# Patient Record
Sex: FEMALE
[De-identification: names, ages, dates, MRNs, and addresses within clinical notes are randomized per-mention and may not be internally consistent; named-entity substitution may affect disease eponyms.]

---

## 2018-10-08 LAB
ANION GAP SERPL CALC-SCNC: 11 MMOL/L (ref 8–16)
BUN SERPL-MCNC: 18 MG/DL (ref 7–26)
BUN/CREAT SERPL: 24 (ref 6–25)
CALCIUM SERPL-MCNC: 9.9 MG/DL (ref 8.4–10.2)
CHLORIDE SERPL-SCNC: 103 MMOL/L (ref 98–107)
CO2 SERPL-SCNC: 28 MMOL/L (ref 22–29)
EGFRCR SERPLBLD CKD-EPI 2021: > 60 ML/MIN (ref 60–?)
GLUCOSE SERPLBLD-MCNC: 86 MG/DL (ref 74–118)
POTASSIUM SERPL-SCNC: 4 MMOL/L (ref 3.5–5.1)
SODIUM SERPL-SCNC: 139 MMOL/L (ref 136–145)

## 2018-10-15 ENCOUNTER — HOSPITAL ENCOUNTER (OUTPATIENT)
Dept: HOSPITAL 88 - OR | Age: 63
Discharge: HOME | End: 2018-10-15
Attending: SPECIALIST
Payer: COMMERCIAL

## 2018-10-15 VITALS — SYSTOLIC BLOOD PRESSURE: 128 MMHG | DIASTOLIC BLOOD PRESSURE: 79 MMHG

## 2018-10-15 DIAGNOSIS — I10: ICD-10-CM

## 2018-10-15 DIAGNOSIS — G56.02: Primary | ICD-10-CM

## 2018-10-15 DIAGNOSIS — E66.01: ICD-10-CM

## 2018-10-15 DIAGNOSIS — Z01.810: ICD-10-CM

## 2018-10-15 DIAGNOSIS — M65.312: ICD-10-CM

## 2018-10-15 DIAGNOSIS — Z79.82: ICD-10-CM

## 2018-10-15 DIAGNOSIS — M06.9: ICD-10-CM

## 2018-10-15 DIAGNOSIS — M19.90: ICD-10-CM

## 2018-10-15 DIAGNOSIS — E78.5: ICD-10-CM

## 2018-10-15 DIAGNOSIS — Z01.812: ICD-10-CM

## 2018-10-15 PROCEDURE — 80048 BASIC METABOLIC PNL TOTAL CA: CPT

## 2018-10-15 PROCEDURE — 36415 COLL VENOUS BLD VENIPUNCTURE: CPT

## 2018-10-15 PROCEDURE — 26055 INCISE FINGER TENDON SHEATH: CPT

## 2018-10-15 PROCEDURE — 29848 WRIST ENDOSCOPY/SURGERY: CPT

## 2018-10-15 PROCEDURE — 93005 ELECTROCARDIOGRAM TRACING: CPT

## 2018-10-15 NOTE — OPERATIVE REPORT
DATE OF PROCEDURE:  October 15, 2018 



ASSISTANT:   Adilson Camejo PA-C



The patient was brought to the operating room for induction of anesthesia.  

Throughout this case, my PA's assistance was necessary for retraction of 

soft tissue and positioning of the extremity.  This allows for efficient 

and technically successful execution of the operation and is considered 

medically necessary.



PREOPERATIVE DIAGNOSES

1. Left carpal tunnel syndrome.

2. Left trigger thumb.  



POSTOPERATIVE DIAGNOSES

1. Left carpal tunnel syndrome.

2. Left trigger thumb.  



PROCEDURES

1. Left endoscopic carpal tunnel release.  

2. Left trigger thumb release.



INDICATIONS:  The patient is a 63-year-old woman who has clinic signs and 

symptoms consistent with left carpal tunnel syndrome and left trigger 

thumb.  She has failed conservative management and would like to proceed 

with surgical intervention.  The risks and benefits of a left endoscopic 

versus open carpal tunnel release and left trigger thumb release have been 

explained.  She states she understands and wishes to proceed.



DESCRIPTION OF PROCEDURE:  The patient was brought to the operating room 

and placed under general anesthetic.  Her left upper extremity was prepped 

and draped in a sterile manner.  A preoperative time out was performed.  

The extremity was exsanguinated, and a proximal tourniquet was inflated to 

250 mmHg.  Initial attention was directed towards the carpal tunnel.  An 

incision was made over the flexion crease of the left wrist.  The palmaris 

longus was retracted to the radial side of the wound.  The flexor 

retinaculum was elevated and incised.  An elevator was used to tease the 

tenosynovium off of the undersurface of the transverse carpal ligament.  

Dilators were placed.  The hook of the hamate was palpated.  The MicroAire 

endoscope was then placed into the carpal tunnel.  The undersurface of the 

transverse carpal ligament could be cleanly visualized without evidence of 

soft-tissue interposition.  The knife was deployed, and the ligament was 

cut.  Full-thickness release was noted.  The proximal retinaculum was 

incised under direct visualization using a pair of blunt Metzenbaum 

scissors.  The incision was then closed with 2 interrupted nylon stitches.  

A sterile bandage was applied.  



Attention was then directed towards the thumb.  An incision was made at the 

base of the thumb.  The A1 pulley was carefully exposed.  This was 

completely released with a 15-blade surgical knife.  The tendon was 

retracted from the wound.  Some intratendinous adhesions were released.  

The thumb was noted to flex and extend without any further stenosing 

tenosynovitis.  The wound was irrigated and closed with 2 interrupted nylon 

stitches.  A sterile bandage was applied.  



The patient was then extubated and transported to the recovery room in 

stable condition.  There was no blood loss, and all needle and sponge 

counts were correct.







DD:  10/15/2018 09:35

DT:  10/15/2018 11:07

Job#:  [id number]

## 2018-10-16 NOTE — XMS REPORT
[facility]

                             Created on: 2016



Yarely Hayes

External Reference #: [id number]

: 1955

Sex: Female



Demographics







                          Address                   [address] [place]  [address]

 

                          Home Phone                [phone]

 

                          Preferred Language        Unknown

 

                          Marital Status            Unknown

 

                          [place] Affiliation     Unknown

 

                          Race                      Unknown

 

                          Ethnic Group              /Latin





Author







                          Author                    Mayra Daley

 

                          Organization              [facility]

 

                          Address                   Unknown

 

                          Phone                     Unavailable







Care Team Providers







                    Care Team Member Name    Role                Phone

 

                    Kristycorona  Mayra      CP                  Unavailable



                                                                



Allergies, Adverse Reactions, Alerts

          





                    Substance           Reaction            Event Type

 

                    N.K.D.A.            Info Not Available    Non Drug Allergy



                                                                    



Encounters

          





                    Encounter           Location            Date

 

                    Osteoarthritis      [facility]    2016



                                                                                
       



Problems

          





             Problem Type    Condition    ICD-9 Code    Onset Dates    Condition Status

 

             Assessment    Pain in right knee    M25.561                   Active

 

             Assessment    Asymptomatic menopausal state    V49.81                    Active

 

             Problem      Osteoarthritis of knees, bilateral    M17.0                     Active

 

             Assessment    Counseling NOS    Z71.9                     Active

 

             Assessment    Pain in left knee    M25.562                   Active

 

             Assessment    Osteoarthritis of knees, bilateral    M17.0                     Active



                                                                                
                                                         



Medications

          





        Medication    Code System    Code    Instructions    Start Date    End Date    Status    Dosage



 

        Diclofenac Sodium    [facility]    [address]-36    1 % Transdermal                     Active    Unknown



 

        Simvastatin    [facility]    [address]-54    40 MG Orally Once a day                    Active    1 tablet

 in the evening

 

        Aspirin    [facility]    [address]-96    81 MG Orally Once a day                    Active    1 tablet



 

        Lidocaine    [facility]    [address]-93    4 % Externally                     Active    Unknown

 

        Multivitamin/Minerals    Unknown    0         QD                    Active    1 Tab

 

        RelyyT    [facility]    [address]-37    0.025-5 % Externally                     Active    Unknown

 

                Diclofenac-Misoprostol    [facility]        [address]-82    75-0.2 MG Orally Twice a day prn

 with food      2016                    Active          1 tablet

 

                Lisinopril-Hydrochlorothiazide    [facility]        [address]-36    20-25 MG Orally Once a 

day                                             Active          1 tablet

 

          Prevacid 24HR    [facility]    [address]-10    15 MG Orally Once a day PRN                        Active

                                        1 capsule



                                                                                
                                                                                
      



Social History

          





                    Social History Element    Qualifiers          Date Reported

 

                    Smoking status:     .  Are you a: Never Smoker    2016



                                                                                
                 



Vital Signs

          





                          Date/Time:                2016

 

                          Height                    61 in

 

                          Blood Pressure Diastolic    81 mm Hg

 

                          Blood Pressure Systolic    131 mm Hg

 

                          Weight                    243.0 lbs



                                                                    



Summary Purpose

          [employer] Submission

## 2018-10-16 NOTE — XMS REPORT
[facility]ology Clinic

                             Created on: 05/15/2018



Yarely Hayes

External Reference #: [id number]

: 1955

Sex: Female



Demographics







                          Address                   [address] [place]  [address]

 

                          Home Phone                [phone]

 

                          Preferred Language        Unknown

 

                          Marital Status            Unknown

 

                          Hoahaoism Affiliation     Unknown

 

                          Race                      Unknown

 

                          Ethnic Group              /Latin





Author







                          Author                    Mayra Daley

 

                          Organization              [facility]

 

                          Address                   Unknown

 

                          Phone                     Unavailable







Care Team Providers







                    Care Team Member Name    Role                Phone

 

                    Mayra Daley      CP                  Unavailable



                                                                



Allergies, Adverse Reactions, Alerts

          





                    Substance           Reaction            Event Type

 

                    N.K.D.A.            Info Not Available    Non Drug Allergy



                                                                                
       



Problems

          





             Problem Type    Condition    Code         Onset Dates    Condition Status

 

             Assessment    ESR raised    R70.0                     Active

 

             Assessment    Vitamin D deficiency    E55.9                     Active

 

             Problem      Osteoarthritis of knees, bilateral    M17.0                     Active

 

             Assessment    Pain in right knee    M25.561                   Active

 

             Problem      Vitamin D deficiency    E55.9                     Active

 

             Assessment    Counseling NOS    Z71.9                     Active

 

             Assessment    Osteopenia    M85.80                    Active

 

             Assessment    Pain in left knee    M25.562                   Active

 

             Assessment    Osteoarthritis of knees, bilateral    M17.0                     Active



                                                                                
                                                                                
      



Medications

          





        Medication    Code System    Code    Instructions    Start Date    End Date    Status    Dosage



 

        Aspirin    [facility]     12062837909    81 MG Orally Once a day                    Active    1 tablet

 

        Calcium    [facility]     0        Oral                     Active    1 tab

 

        Bioflex    [facility]     [address]-40     Orally Once a day                    Active    1 tab(s)

 

                Diclofenac-Misoprostol    [facility]             68992876749     75-0.2 MG Orally Twice a day prn with 

food                                            Active          1 tablet

 

             Lisinopril-Hydrochlorothiazide    [facility]          86103566471    20-25 MG Orally Once a day      

                                        Active              1 tablet

 

        Prevacid 24HR    [facility]     36510898160    15 MG Orally Once a day PRN                    Active    1 capsule



 

        Multivitamin/Minerals    [facility]     0         QD                    Active    1 Tab

 

           Vitamin D (Ergocalciferol)    [facility]        90384693285    55129 UNIT Orally Once weekly               

                          Active                    1 capsule

 

        Simvastatin    [facility]     63642182016    40 MG Orally Once a day                    Active    1 tablet in

 the evening



                                                                                
                                                                                
                



Vital Signs

          





                          Date/Time:                May 14, 2018

 

                          Height                    61 in

 

                          Blood Pressure Diastolic    58 mm Hg

 

                          Blood Pressure Systolic    94 mm Hg

 

                          Weight                    243.6 lbs



                                                                              



Results

          No Known Results                                                      
             



Summary Purpose

          [employer] Submission

## 2018-10-16 NOTE — XMS REPORT
Continuity of Care Document

                             Created on: 2018



LES HAYES

External Reference #: [id number]

: 1955

Sex: Female



Demographics







                          [place]                   [address] [place]  [address]

 

                          Home Phone                [phone]

 

                          Preferred Language        Unknown

 

                          Marital Status            Unknown

 

                          Caodaism Affiliation     Unknown

 

                          Race                      Unknown

 

                          Ethnic Group              Unknown





Author







                          Author                    [facility]              Interface

 

                          Address                   Unknown

 

                          Phone                     Unavailable



                                                    



Problems

                    





                    Problem                            Status                            Onset Date     

                          Classification                            Date Reported       

                          Comments                            Source                    

 

                          Z12.31 - ENCNTR SCREEN MAMMOGRAM FOR MA                            Active         

                    2016                                                          

                                                       [facility] Seymour               

     

 

                          V76.12 - SCREEN MAMMOGRA                            Active                        

                    2014                                                                         

                                                       [facility] Seymour                    

 

                    ESR raised                            Active                                        

                          Diagnosis                            2018                      

                                                      Mayralaurel Daley                    

 

                    Vitamin D deficiency                            Active                              

                          Diagnosis                            2018            

                                                      Mayralaurel Daley                    

 

                          Osteoarthritis of knees, bilateral                            Active              

                                                Problem                            2018

                                                        Mayra Nacorona                 

   

 

                    Pain in right knee                            Active                                

                          Diagnosis                            2018              

                                                      Mayra Daley                    

 

                    Counseling NOS                            Active                                    

                          Diagnosis                            2018                  

                                                      Mayralaurel Daley                    

 

                    Osteopenia                            Active                                        

                          Diagnosis                            2018                      

                                                      Mayralaurel Daley                    

 

                    Pain in left knee                            Active                                 

                          Diagnosis                            2018               

                                                      Mayralaurel Daley                    

 

                          Asymptomatic menopausal state                            Active                   

                                                Diagnosis                            2016   

                                                      Mayra Ramilalorie                    



 

                                        Age-related osteoporosis without current pathological fracture                  

                    Active                                                        Diagnosis      

                          2016                                                     

                                        Mayra Daley                    



                                                                                
                                                                                
                                                                                
     



Medications

                    





                    Medication                            Details                            Route      

                          Status                            Patient Instructions         

                          Ordering Provider                            Order Date           

                                        Source                    

 

                          Diclofenac-Misoprostol                            1 tablet                        

                    Orally                            Active                            75-0.2 MG Orally

 Twice a day prn with food                            [facility]                        

                          2017                            Mayra Ramila                    

 

                          Diclofenac-Misoprostol                            1 tablet                        

                    Orally                            Active                            75-0.2 MG Orally

 Twice a day prn with food                            [facility]                        

                          2017                            Mayra Edi                    

 

                          Pennsaid                            2 applications to affected area               

                    Transdermal                            Active                            2

 % Transdermal Twice a day                            [facility]                        

                          2016                            Mayra Daley                    

 

                          Vitamin D (Ergocalciferol)                            1 capsule                   

                    Orally                            Active                            89027 UNIT

 Orally Once weekly                            [facility]                            2016

                                        Mayra Mcgrath                    

 

                          Diclofenac-Misoprostol                            1 tablet                        

                    Orally                            Active                            75-0.2 MG Orally

 Twice a day prn with food                            [facility]                        

                          2016                            Mayra Daley                    

 

                          Simvastatin                            1 tablet in the evening                    

                    Orally                            Active                            40 MG Orally

 Once a day                            [facility] Ramila                    

 

                          Vitamin D (Ergocalciferol)                            1 capsule                   

                    Orally                            Active                            81270 UNIT

 Orally Once weekly                            [facility]                                 

                                        Mayra Mcgrathm                    

 

                    Multivitamin/Minerals                            1 Tab                            NA

                            Active                            QD                     

                    Nacorona                                                        Mayralaurel Daley       

             

 

                    Aspirin                            1 tablet                            Orally       

                          Active                            81 MG Orally Once a day       

                     Najalorie Daley                    

 

                          Diclofenac-Misoprostol                            1 tablet                        

                    Orally                            Active                            75-0.2 MG Orally

 Twice a day prn with food                            Edi Daley                    

 

                    Bioflex                            1 tab(s)                            Orally       

                          Active                            Orally Once a day             

                    Najam                                                        Mayra Najalorie

                    

 

                    Calcium                            1 tab                            Oral            

                    Active                            Oral                            Najam

                                                        Mayra Najam                 

   

 

                    Prevacid 24HR                            1 capsule                            Orally

                            Active                            15 MG Orally Once a day

 PRN                            Najam                                                

                                        Mayra Nacorona                    

 

                          Lisinopril-Hydrochlorothiazide                            1 tablet                

                    Orally                            Active                            20-25 MG

 Orally Once a day                            Najalorie                                  

                                        Mayra Nacorona                    

 

                          Simvastatin                            1 tablet in the evening                    

                    Orally                            Active                            40 MG Orally

 Once a day                            Najalorie                                         

                                        Mayra Nacorona                    

 

                    Prevacid 24HR                            1 capsule                            Orally

                            Active                            15 MG Orally Once a day

 PRN                            Najam                                                

                                        Mayra Nacorona                    

 

                          Lisinopril-Hydrochlorothiazide                            1 tablet                

                    Orally                            Active                            20-25 MG

 Orally Once a day                            Nacorona Daley                    

 

                    Aspirin                            1 tablet                            Orally       

                          Active                            81 MG Orally Once a day       

                     Nacorona Daley                    

 

                    Diclofenac Sodium                            not defined                            

Transdermal                            Active                            1 % Transdermal

                            Najam                                                    

                                        Mayra Nacorona                    

 

                    Lidocaine                            Unknown                            Externally  

                          Active                            4 % Externally           

                    Najam                                                        Mayra Nacorona

                    

 

                    Multivitamin/Minerals                            1 Tab                            NA

                            Active                            QD                     

                    Nagingerm                                                        Mayra Nacorona       

             

 

                    RelyyT                            Unknown                            Externally     

                          Active                            0.025-5 % Externally        

                    Najam                                                        Mayra

 Najalorie                    

 

                    Calcium                            1 tab                            Oral            

                    Active                            Oral                            Nagingerm

                                                        Mayralaurel Daley                 

   

 

                          Diclofenac-Misoprostol                            1 tablet                        

                    Orally                            Active                            75-0.2 MG Orally

 Twice a day prn with food                            Edi Daley                    



                                                                                
                                                                                
                                                                                
                                                                                
                                                                                
                                                   



Allergies, Adverse Reactions, Alerts

                    





                    Substance                            Category                            Reaction   

                          Severity                            Reaction type           

                          Status                            Date Reported                     

                          Comments                            Source                    

 

                    N.K.D.A.                            Adverse Reaction                            Info

 Not Available                                                        Adverse Reaction

                            Active                            2018             

                                                      Mayra Nacorona                    



                                                                        



Immunizations

                    





                    Immunization                            Date Given                            Site  

                          Status                            Last Updated             

                          Comments                            Source                    



                                                                        



Results

                    





                    Order Name                            Results                            Value      

                          Reference Range                            Date                

                          Interpretation                            Comments                       

                                        Source                    

 

                          Breast Mammo Scrn MATT incl CAD MA                            Breast Mammo Scrn MATT

 incl CAD MA                         





BILATERAL DIGITAL SCREENING MAMMOGRAM WITH CAD: 10/7/2017





CLINICAL: /Z12.31.  





Current study was evaluated with a Computer Aided Detection (CAD) system.  



COMPARISON:Comparison is made to exams dated:  2016 mammogram, 2015 
mammogram, 2014 mammogram, 2013 mammogram - [facility],
2012 mammogram, and 2011 mammogram - [facility].   



TECHNIQUE: Mammographic views were obtained using digital acquisition. Current 
study was also evaluated with a Computer Aided Detection (CAD) system. There are
scattered fibroglandular densities in both breasts.  



FINDINGS: 

There are benign appearing calcifications in both breasts.  There also is a 
benign appearing density in both breasts.  

No significant masses, calcifications, or other findings are seen in either 
breast.  

There has been no significant interval change.





IMPRESSION: BENIGN



RECOMMENDATION:There is no mammographic evidence of malignancy. A 1 year 
screening mammogram is recommended.(10/08/2018)   [id number]  



Beulah Spain M.D.          

ms/penrad:10/11/2017 12:44:14  



Imaging Technologist(s): Jeni Hayes [facility]

letter sent: BI-RADS 1/2  

Mammogram BI-RADS: 2 Benign

                                                          10/07/2017                 

                                                      -

                                        -





Read by:  Beulah Spain MD

Dictated Date/time:  10/11/17 12:44

Electronically Signed by:  Beulah Spain MD                     10/11/17 
12:44

FINAL REPORT

                                        [facility]                    

 

                          Digital Mammo Screening Matt [place]                            Digital Mammo Screening 

Matt MA                                   - DIGITAL MAMMO SCREENING MATT MA

BILATERAL DIGITAL SCREENING MAMMOGRAM WITH CAD: 2016

CLINICAL: Routine.  



Current study was evaluated with a Computer Aided Detection (CAD) system.  

Comparison is made to exams dated:  2015 mammogram, 2014 mammogram, 
2013 mammogram - [facility], 2012 mammogram, 2011 
mammogram and 2009 mammogram - [facility].  

There are scattered fibroglandular densities in both breasts.  

There are benign bilateral breast masses.

There are benign scattered calcifications in both breasts.

No significant masses, calcifications, or other findings are seen in either 
breast.  



IMPRESSION: BENIGN

There is no mammographic evidence of malignancy.  A 1 year screening mammogram 
is recommended. 



Parisa Armstrong M.D.          

rn/:2016 10:17:49  



Imaging Technologist: Quiana Levy RT(R)(M), [facility]

This exam was dictated and interpreted by [id number] for ARACELY Cintron.  

letter sent: Bilateral Benign  

Mammogram BI-RADS: 2 Benign

                                                          2016                 

                                                      -

                                        -





Read by:  Parisa Armstrong MD

Dictated Date/time:  16 10:17

Electronically Signed by:  Parisa Armstrong MD                         16 
10:17

FINAL REPORT

                                         [facility] Seymour                    

 

                          Digital Mammo Screening Matt MA                            Digital Mammo Screening 

Matt MA                                   - DIGITAL MAMMO SCREENING MATT MA

BILATERAL DIGITAL SCREENING MAMMOGRAM WITH CAD: 2015

CLINICAL: Routine.  



Current study was evaluated with a Computer Aided Detection (CAD) system.  

Comparison is made to exams dated:  2012 mammogram - [facility], 2013 mammogram and 2014 mammogram - [facility].  

The tissue of both breasts is heterogeneously dense, which could obscure 
detection of small masses.  

No significant masses, calcifications, or other findings are seen in either 
breast.  

There has been no significant interval change.



IMPRESSION: BENIGN

There is no mammographic evidence of malignancy.  A 1 year screening mammogram 
is recommended. 



Vega Salas M.D.          

srp/penrad:2015 11:09:42  



Imaging Technologist: Quiana PICKERING(R)(M), [facility]

This exam was dictated and interpreted by [id number] for [facility] Montoya.  

letter sent: Normal exam  

Mammogram BI-RADS: 2 Benign

                                                          2015                 

                                                      -

                                        -





Read by:  Vega Salas MD

Dictated Date/time:  06/29/15 11:09

Electronically Signed by:  Vega Salas MD                06/29/15 
11:09

FINAL REPORT

                                         [facility] Seymour                    

 

                          Digital Mammo Screening Matt MA                            Digital Mammo Screening 

Matt MA                                   - DIGITAL MAMMO SCREENING MATT MA

BILATERAL DIGITAL SCREENING MAMMOGRAM WITH CAD: 2014

CLINICAL: Screening.  



Current study was evaluated with a Computer Aided Detection (CAD) system.  

Comparison is made to exams dated:  2007 mammogram, 2009 mammogram, 
10/27/2008 mammogram, 2011 mammogram, 2012 mammogram - [facility] and 2013 mammogram - [facility].  

There are scattered fibroglandular densities in both breasts.  

Several benign-appearing noncalcified masses, both breasts, some larger and 
others smaller, probably cysts.  Consider aspiration of any palpable mass, if 
clinically indicated.  Also recommend repeat mammography in one year.  

No significant masses, calcifications, or other findings are seen in either 
breast.  

There has been no significant interval change.



IMPRESSION: BENIGN

There is no mammographic evidence of malignancy.  A screening mammogram in one 
year is recommended. 



Dr. Staci Thapa M.D.

,eoc/penrad:2014 09:57:45  



Imaging Technologist: Brandie Adrian RT(R)(M), [facility]

This exam was dictated and interpreted by [id number] for ARACELY Montoya.  

letter sent: Normal exam  

Mammogram BI-RADS: 2 Benign

                                                          2014                 

                                                      -

                                        -





Read by:  Staci Santana DO

Dictated Date/time:  14 09:57

Electronically Signed by:  Staci Santana DO                  14 
09:57

FINAL REPORT

                                        ARACELY Montoya                    



                                                                                
                                                               



Vital Signs

                    





                    Vital Sign                            Value                            Date         

                          Comments                            Source                    

 

                    Height                            61                             2018         

                                                      Mayra Najam                    

 

                    Diastolic (mm Hg)                            58                             2018

                                                        Mayra Najam                 

   

 

                    Systolic (mm Hg)                            94                             2018

                                                        Mayra Najam                 

   

 

                    Weight                            243.6                             2018      

                                                      Mayra Najam                    

 

                    Height                            61                             2017         

                                                      Mayar Najam                    

 

                    Diastolic (mm Hg)                            81                             2017

                                                        Mayra Najam                 

   

 

                    Systolic (mm Hg)                            137                             2017

                                                        Mayra Najam                 

   

 

                    Weight                            245                             2017        

                                                      Mayra Najam                    

 

                    Height                            61                             05/15/2017         

                                                      Mayra Najam                    

 

                    Diastolic (mm Hg)                            79                             05/15/2017

                                                        Mayra Najam                 

   

 

                    Systolic (mm Hg)                            127                             05/15/2017

                                                        Mayra Najam                 

   

 

                    Weight                            243                             05/15/2017        

                                                      Mayra Najam                    

 

                    Height                            61                             2016         

                                                      Mayra Najam                    

 

                    Diastolic (mm Hg)                            81                             2016

                                                        Mayra Nagingerm                 

   

 

                    Systolic (mm Hg)                            132                             2016

                                                        Mayra Nacorona                 

   

 

                    Weight                            245.4                             2016      

                                                      Mayra Najam                    

 

                    Height                            61                             2016         

                                                      Mayra Najam                    

 

                    Diastolic (mm Hg)                            82                             2016

                                                        Mayra Najam                 

   

 

                    Systolic (mm Hg)                            126                             2016

                                                        Mayra Najam                 

   

 

                    Weight                            246                             2016        

                                                      Mayra Najam                    

 

                    Height                            61                             2016         

                                                      Mayra Najam                    

 

                    Diastolic (mm Hg)                            81                             2016

                                                        Mayra Najam                 

   

 

                    Systolic (mm Hg)                            131                             2016

                                                        Mayra Nagingerm                 

   

 

                    Weight                            243.0                             2016      

                                                      Mayralaurel Daley                    



                                                                                
                                                                                
                                                                                
                                                                                
                                                                                
                                                   



Encounters

                    





                    Location                            Location Details                            Encounter

 Type                            Encounter Number                            Reason For

 Visit                            Attending Provider                            ADM Date

                            DC Date                            Status                

                                        Source                    

 

                          [facility]                                                

                          Outpt Diag Services                            [id number]                  

                                                Ben Flaherty                             2014                                               

                                         [facility]                    

 

                          [facility]                                                

                          Outpt Diag Services                            [id number]                  

                                                Maricel Bailey                             2015                                               

                                         [facility] [place]                    

 

                    Rheumatology Clinic                                                        Osteoarthritis

                                        [id number]                   

                                                                              2016                                                        [facility]                    

 

                    Rheumatology Clinic                                                        Osteoarthritis

                                        [id number]                   

                                                                              2016                                                        [facility]                    

 

                    Rheumatology Clinic                                                        Osteoarthritis

                                        [id number]                   

                                                                              2016                                                        [facility]                    

 

                    Rheumatology Clinic                                                        Osteoarthritis

                                        [id number]                   

                                                                              2016                                                        [facility]                    

 

                    Rheumatology Clinic                                                        Osteoarthritis

                                        [id number]                   

                                                                              2016                                                        [facility]                    

 

                    Rheumatology Clinic                                                        Osteoarthritis

                                        [id number]                   

                                                                              2016                                                        [facility]                    

 

                    Rheumatology Clinic                                                        Osteoarthritis

                                        [id number]                   

                                                                              2016                                                        [facility]                    

 

                    Rheumatology Clinic                                                        Osteoarthritis

                                        [id number]                   

                                                                              2016                                                        [facility]                    

 

                    Rheumatology Clinic                                                        Mri/Dexa 

Order                                   [id number]              

                                                                              06/10/2016     

                          06/10/2016                                                    

                                        [facility]                    

 

                    Rheumatology Clinic                                                        Mri/Dexa 

Order                                   [id number]              

                                                                              06/10/2016     

                          06/10/2016                                                    

                                        [facility]                    

 

                    Rheumatology Clinic                                                        Mri/Dexa 

Order                                   [id number]              

                                                                              06/10/2016     

                          06/10/2016                                                    

                                        [facility]                    

 

                    Rheumatology Clinic                                                        Mri/Dexa 

Order                                   [id number]              

                                                                              06/10/2016     

                          06/10/2016                                                    

                                        [facility]                    

 

                    Rheumatology Clinic                                                        Mri/Dexa 

Order                                   [id number]              

                                                                              06/10/2016     

                          06/10/2016                                                    

                                        [facility]                    

 

                    Rheumatology Clinic                                                        Mri/Dexa 

Order                                   [id number]              

                                                                              06/10/2016     

                          06/10/2016                                                    

                                        [facility]                    

 

                    Rheumatology Clinic                                                        Synvisc one-

Pending Denial                            [id number]     

                                                                                 2016                                               

                                        [facility]                    

 

                    Rheumatology Clinic                                                        Synvisc one-

Pending Denial                            [id number]     

                                                                                 2016                                               

                                        [facility]                    

 

                    Rheumatology Clinic                                                        Synvisc one-

Pending Denial                            [id number]     

                                                                                 2016                                               

                                        [facility]                    

 

                    Rheumatology Clinic                                                        Synvisc one-

Pending Denial                            [id number]     

                                                                                 2016                                               

                                        [facility]                    

 

                    Rheumatology Clinic                                                        Synvisc one-

Pending Denial                            [id number]     

                                                                                 2016                                               

                                        [facility]                    

 

                    Rheumatology Clinic                                                        Follow Up

 Lab & MRI Results                            [id number] 

                                                                                     2016                                               

                                        [facility]                    

 

                    Rheumatology Clinic                                                        Follow Up

 Lab & MRI Results                            [id number] 

                                                                                     2016                                               

                                        [facility]                    

 

                    Rheumatology Clinic                                                        Follow Up

 Lab & MRI Results                            [id number] 

                                                                                     2016                                               

                                        [facility]                    

 

                    Rheumatology Clinic                                                        Follow Up

 Lab & MRI Results                            [id number] 

                                                                                     2016                                               

                                        [facility]                    

 

                    Rheumatology [facility]                                                        New Vit D

                                        [id number]                   

                                                                              2016                                                        [facility]                    

 

                    Rheumatology [facility]                                                        New Vit D

                                        [id number]                   

                                                                              2016                                                        [facility]                    

 

                    Rheumatology [facility]                                                        New Vit D

                                        [id number]                   

                                                                              2016                                                        [facility]

 [facility]                                                

                          Outpt Diag Services                            [id number]                  

                                                Maricel Bailey                             2016                                               

                                        [facility]                    

 

                    Rheumatology Clinic                                                        Vit D refill

                                        [id number]                   

                                                                              11/10/2016          

                    11/10/2016                                                        [facility]                    

 

                    Rheumatology Clinic                                                        Vit D refill

                                        [id number]                   

                                                                              11/10/2016          

                    11/10/2016                                                        [facility]                    

 

                    Rheumatology Clinic                                                        Follow up

 4 Month                                [id number]           

                                                                              2016                                                 

                                        Mayra Daley                    

 

                          [facility]                                                

                          Outpt Dia Services                            [id number]                  

                                                Denisa Flaherty                             10/07/2017

                            10/08/2017                                               

                                         CARY Montoya                    



                                                                                
                                                                                
                                                                                
                                                                                
                                                                                
                                                               



Procedures

                    





                    Procedure                            Code                            Date           

                          Perfomer                            Comments                        

                                        Source

## 2018-10-16 NOTE — XMS REPORT
Rheumatology Clinic

                             Created on: 2016



Yarely Hayes

External Reference #: [id number]

: 1955

Sex: Female



Demographics







                          Address                   [address] [place]  [address]

 

                          Home Phone                [phone]

 

                          Preferred Language        Unknown

 

                          Marital Status            Unknown

 

                          Quaker Affiliation     Unknown

 

                          Race                      Unknown

 

                          Ethnic Group              /Latin





Author







                          Author                    Mayra Daley

 

                          Organization              [employer]

 

                          Address                   Unknown

 

                          Phone                     Unavailable







Care Team Providers







                    Care Team Member Name    Role                Phone

 

                    Mayra Daley      CP                  Unavailable



                                            



Encounters

          





                    Encounter           Location            Date

 

                    Osteoarthritis      Rheumatology Clinic    2016

 

                    Mri/Dexa Order      Rheumatology Clinic    [place] 10, 2016

 

                    Synvisc one-Pending Denial    Rheumatology Clinic    2016



                                                                                
                           



Problems

          





             Problem Type    Condition    ICD-9 Code    Onset Dates    Condition Status

 

             Problem      Osteoarthritis of knees, bilateral    M17.0                     Active



                                                                                
       



Social History

          





                    Social History Element    Qualifiers          Date Reported

 

                    Smoking status:     .  Are you a: Never Smoker    2016



                                                                    



Summary Purpose

          [employer] Submission

## 2018-10-16 NOTE — XMS REPORT
[facility]

                             Created on: 2017



Yarely Hayes

External Reference #: [id number]

: 1955

Sex: Female



Demographics







                          Address                   [address] [place]  [address]

 

                          Home Phone                [phone]

 

                          Preferred Language        Unknown

 

                          Marital Status            Unknown

 

                          Oriental orthodox Affiliation     Unknown

 

                          Race                      Unknown

 

                          Ethnic Group              /Latin





Author







                          Author                    Mayra Daley

 

                          Organization              [employer]

 

                          Address                   Unknown

 

                          Phone                     Unavailable







Care Team Providers







                    Care Team Member Name    Role                Phone

 

                    Mayra Daley      CP                  Unavailable



                                                                



Allergies, Adverse Reactions, Alerts

          





                    Substance           Reaction            Event Type

 

                    N.K.D.A.            Info Not Available    Non Drug Allergy



                                                                                
       



Problems

          





             Problem Type    Condition    Code         Onset Dates    Condition Status

 

             Assessment    ESR raised    R70.0                     Active

 

             Assessment    Vitamin D deficiency    E55.9                     Active

 

             Problem      Osteoarthritis of knees, bilateral    M17.0                     Active

 

             Assessment    Pain in right knee    M25.561                   Active

 

             Problem      Vitamin D deficiency    E55.9                     Active

 

             Assessment    Counseling NOS    Z71.9                     Active

 

             Assessment    Osteopenia    M85.80                    Active

 

             Assessment    Pain in left knee    M25.562                   Active

 

             Assessment    Osteoarthritis of knees, bilateral    M17.0                     Active



                                                                                
                                                                                
      



Medications

          





        Medication    Code System    Code    Instructions    Start Date    End Date    Status    Dosage



 

        Simvastatin    ND[facility]     78709001299    40 MG Orally Once a day                    Active    1 tablet in

 the evening

 

           Vitamin D (Ergocalciferol)    [facility]        53435244617    83751 UNIT Orally Once weekly               

                          Active                    1 capsule

 

        Multivitamin/Minerals    ND[facility]     0         QD                    Active    1 Tab

 

        Aspirin    [facility]     33964763393    81 MG Orally Once a day                    Active    1 tablet

 

                Diclofenac-Misoprostol    [facility]             19688670230     75-0.2 MG Orally Twice a day prn with 

food                                            Active          1 tablet

 

        Bioflex    [facility]     [address]-87     Orally Once a day                    Active    1 tab(s)

 

        Calcium    NDC     0        Oral                     Active    1 tab

 

        Prevacid 24HR    ND[facility]     35805133931    15 MG Orally Once a day PRN                    Active    1 capsule



 

             Lisinopril-Hydrochlorothiazide    ND[facility]          38994024420    20-25 MG Orally Once a day      

                                        Active              1 tablet



                                                                                
                                                                                
                



Vital Signs

          





                          Date/Time:                2017

 

                          Height                    61 in

 

                          Blood Pressure Diastolic    81 mm Hg

 

                          Blood Pressure Systolic    137 mm Hg

 

                          Weight                    245 lbs



                                                                              



Results

          No Known Results                                                      
             



Summary Purpose

          [employer] Submission

## 2018-10-16 NOTE — XMS REPORT
[facility]

                             Created on: 2016



Yarely Hayes

External Reference #: [id number]

: 1955

Sex: Female



Demographics







                          Address                   [address] [place]  [address]

 

                          Home Phone                [phone]

 

                          Preferred Language        Unknown

 

                          Marital Status            Unknown

 

                          Scientology Affiliation     Unknown

 

                          Race                      Unknown

 

                          Ethnic Group              /Latin





Author







                          Author                    Mayra Daley

 

                          Organization              [employer]

 

                          Address                   Unknown

 

                          Phone                     Unavailable







Care Team Providers







                    Care Team Member Name    Role                Phone

 

                    Mayra Daley      CP                  Unavailable



                                                                



Allergies, Adverse Reactions, Alerts

          





                    Substance           Reaction            Event Type

 

                    N.K.D.A.            Info Not Available    Non Drug Allergy



                                                                    



Encounters

          





                    Encounter           Location            Date

 

                    Follow Up Lab & MRI Results    [facility]    2016

 

                    New Vit D           [facility]    2016

 

                    Vit D refill        [facility]    Nov 10, 2016

 

                    Follow up 4 Month    [facility]    2016

 

                    Osteoarthritis      [facility]    2016

 

                    Mri/Dexa Order      [facility]    Beena 10, 2016

 

                    Synvisc one-Pending Denial    [facility]    2016



                                                                                
                                                                   



Problems

          





             Problem Type    Condition    ICD-9 Code    Onset Dates    Condition Status

 

             Assessment    ESR raised    R70.0                     Active

 

             Assessment    Vitamin D deficiency    E55.9                     Active

 

             Problem      Osteoarthritis of knees, bilateral    M17.0                     Active

 

             Assessment    Pain in right knee    M25.561                   Active

 

             Problem      Vitamin D deficiency    E55.9                     Active

 

             Assessment    Counseling NOS    Z71.9                     Active

 

             Assessment    Osteopenia    M85.80                    Active

 

             Assessment    Pain in left knee    M25.562                   Active

 

             Assessment    Osteoarthritis of knees, bilateral    M17.0                     Active



                                                                                
                                                                                
      



Medications

          





        Medication    Code System    Code    Instructions    Start Date    End Date    Status    Dosage



 

                Lisinopril-Hydrochlorothiazide    [facility]        [address]-47    20-25 MG Orally Once a 

day                                             Active          1 tablet

 

        Lidocaine    [facility]    [address]-82    4 % Externally                     Active    Unknown

 

          Prevacid 24HR    [facility]    [address]-44    15 MG Orally Once a day PRN                        Active

                                        1 capsule

 

        Bioflex    [facility]    [address]-25     Orally Once a day                    Active    1 tab(s)

 

        Simvastatin    [facility]    [address]-77    40 MG Orally Once a day                    Active    1 tablet

 in the evening

 

        Calcium    Unknown    0        Oral                     Active    1 tab

 

                Diclofenac-Misoprostol    [facility]        [address]-93    75-0.2 MG Orally Twice a day prn

 with food                      May 13, 2017    Active          1 tablet

 

        Aspirin    [facility]    [address]-17    81 MG Orally Once a day                    Active    1 tablet



 

           Pennsaid    [facility]    [address]-91    2 % Transdermal Twice a day    2016    Dec

 14, 2016                 Active                    2 applications to affected area

 

        Multivitamin/Minerals    Unknown    0         QD                    Active    1 Tab

 

        RelyyT    [facility]    [address]-49    0.025-5 % Externally                     Active    Unknown

 

                Vitamin D (Ergocalciferol)    [facility]        14296676587     33984 UNIT Orally Once weekly

                                                Active          1 capsule

 

        Diclofenac Sodium    [facility]    [address]-96    1 % Transdermal                     Active    Unknown





                                                                                
                                                                                
                                              



Social History

          





                    Social History Element    Qualifiers          Date Reported

 

                    Smoking status:     .  Are you a: Never Smoker    2016



                                                                                
                 



Vital Signs

          





                          Date/Time:                2016

 

                          Height                    61 in

 

                          Blood Pressure Diastolic    81 mm Hg

 

                          Blood Pressure Systolic    132 mm Hg

 

                          Weight                    245.4 lbs



                                                                    



Summary Purpose

          [employer] Submission

## 2018-10-16 NOTE — XMS REPORT
Summary of Care: 6/29/15 - 6/29/15

                             Created on: 2108



LES BOYD

External Reference #: [id number]

: 1955

Sex: Female



Demographics







                          Address                   [address]

[place]  [address]

 

                          Home Phone                [phone]

 

                          Preferred Language        English

 

                          Marital Status            

 

                          Adventism Affiliation     Unknown

 

                          Race                      Other

 

                          Ethnic Group              Non-





Author







                          Organization              Unknown

 

                          Address                   Unknown

 

                          Phone                     Unavailable







Encounter





HQ Encntr_alias(FIN) [id number] Date(s): 6/29/15 - 6/29/15

[facility] - [address] [place] [address] 
[phone]

Discharge Disposition: Home

Physician Attending: Maricel Bailey MD





Vital Signs





No data available for this section



Problem List





No data available for this section



Allergies, Adverse Reactions, Alerts





No data available for this section



Medications





No data available for this section



Results





No data available for this section



Immunizations





No data available for this section



Procedures





No data available for this section



Social History





No data available for this section



Assessment and Plan





No data available for this section

## 2018-10-16 NOTE — XMS REPORT
[facility]

                             Created on: 2016



Yarely Hayes

External Reference #: [id number]

: 1955

Sex: Female



Demographics







                          Address                   [address] [place]  [address]

 

                          Home Phone                [phone]

 

                          Preferred Language        Unknown

 

                          Marital Status            Unknown

 

                          Episcopalian Affiliation     Unknown

 

                          Race                      Unknown

 

                          Ethnic Group              /Latin





Author







                          Author                    Mayra Daley

 

                          Organization              [facility]

 

                          Address                   Unknown

 

                          Phone                     Unavailable







Care Team Providers







                    Care Team Member Name    Role                Phone

 

                    Mayra Daley      CP                  Unavailable



                                            



Encounters

          





                    Encounter           Location            Date

 

                    Follow Up Lab & MRI Results    [facility]    2016

 

                    New Vit D           [facility]    2016

 

                    Osteoarthritis      [facility]    2016

 

                    Mri/Dexa Order      [facility]    Beena 10, 2016

 

                    Synvisc one-Pending Denial    [facility]    2016



                                                                                
                                               



Problems

          





             Problem Type    Condition    ICD-9 Code    Onset Dates    Condition Status

 

             Problem      Osteoarthritis of knees, bilateral    M17.0                     Active



                                                                                
       



Medications

          





        Medication    Code System    Code    Instructions    Start Date    End Date    Status    Dosage



 

                Vitamin D (Ergocalciferol)    [facility]        [address]-40    88672 UNIT Orally Once weekly

                    Active          1 capsule



                                                                                
       



Social History

          





                    Social History Element    Qualifiers          Date Reported

 

                    Smoking status:     .  Are you a: Never Smoker    2016



                                                                    



Summary Purpose

          [employer] Submission

## 2018-10-16 NOTE — XMS REPORT
[facility]

                             Created on: 2016



Yarely Hayes

External Reference #: [id number]

: 1955

Sex: Female



Demographics







                          Address                   [address] [place]  [address]

 

                          Home Phone                [phone]

 

                          Preferred Language        Unknown

 

                          Marital Status            Unknown

 

                          Voodoo Affiliation     Unknown

 

                          Race                      Unknown

 

                          Ethnic Group              /Latin





Author







                          Author                    Mayra Daley

 

                          Organization              [facility]

 

                          Address                   Unknown

 

                          Phone                     Unavailable







Care Team Providers







                    Care Team Member Name    Role                Phone

 

                    Kristycorona  Mayra      CP                  Unavailable



                                                                



Allergies, Adverse Reactions, Alerts

          





                    Substance           Reaction            Event Type

 

                    N.K.D.A.            Info Not Available    Non Drug Allergy



                                                                    



Encounters

          





                    Encounter           Location            Date

 

                    Osteoarthritis      [facility]    2016



                                                                                
       



Problems

          





             Problem Type    Condition    ICD-9 Code    Onset Dates    Condition Status

 

             Assessment    Pain in right knee    M25.561                   Active

 

             Assessment    Asymptomatic menopausal state    V49.81                    Active

 

             Problem      Osteoarthritis of knees, bilateral    M17.0                     Active

 

             Assessment    Counseling NOS    Z71.9                     Active

 

             Assessment    Pain in left knee    M25.562                   Active

 

             Assessment    Osteoarthritis of knees, bilateral    M17.0                     Active



                                                                                
                                                         



Medications

          





        Medication    Code System    Code    Instructions    Start Date    End Date    Status    Dosage



 

        Diclofenac Sodium    [facility]    [address]-57    1 % Transdermal                     Active    Unknown



 

        Simvastatin    [facility]    [address]-26    40 MG Orally Once a day                    Active    1 tablet

 in the evening

 

        Aspirin    [facility]    [address]-02    81 MG Orally Once a day                    Active    1 tablet



 

        Lidocaine    [facility]    [address]-41    4 % Externally                     Active    Unknown

 

        Multivitamin/Minerals    Unknown    0         QD                    Active    1 Tab

 

        RelyyT    [facility]    [address]-05    0.025-5 % Externally                     Active    Unknown

 

                Diclofenac-Misoprostol    [facility]        [address]-58    75-0.2 MG Orally Twice a day prn

 with food      2016                    Active          1 tablet

 

                Lisinopril-Hydrochlorothiazide    [facility]        [address]-87    20-25 MG Orally Once a 

day                                             Active          1 tablet

 

          Prevacid 24HR    [facility]    [address]-81    15 MG Orally Once a day PRN                        Active

                                        1 capsule



                                                                                
                                                                                
      



Social History

          





                    Social History Element    Qualifiers          Date Reported

 

                    Smoking status:     .  Are you a: Never Smoker    2016



                                                                                
                 



Vital Signs

          





                          Date/Time:                2016

 

                          Height                    61 in

 

                          Blood Pressure Diastolic    81 mm Hg

 

                          Blood Pressure Systolic    131 mm Hg

 

                          Weight                    243.0 lbs



                                                                    



Summary Purpose

          [employer] Submission

## 2018-10-16 NOTE — XMS REPORT
[facility]

                             Created on: 2016



Yarely Hayes

External Reference #: [id number]

: 1955

Sex: Female



Demographics







                          Address                   [address] [place]  [address]

 

                          Home Phone                [phone]

 

                          Preferred Language        Unknown

 

                          Marital Status            Unknown

 

                          Gnosticist Affiliation     Unknown

 

                          Race                      Unknown

 

                          Ethnic Group              /Latin





Author







                          Author                    Mayra Dlaey

 

                          Organization              [employer]

 

                          Address                   Unknown

 

                          Phone                     Unavailable







Care Team Providers







                    Care Team Member Name    Role                Phone

 

                    Mayra Daley      CP                  Unavailable



                                                                



Allergies, Adverse Reactions, Alerts

          





                    Substance           Reaction            Event Type

 

                    N.K.D.A.            Info Not Available    Non Drug Allergy



                                                                    



Encounters

          





                    Encounter           Location            Date

 

                    Follow Up Lab & MRI Results    [facility]    2016

 

                    Osteoarthritis      [facility]    2016

 

                    Mri/Dexa Order      [facility]    Beena 10, 2016

 

                    Synvisc one-Pending Denial    [facility]    2016



                                                                                
                                     



Problems

          





             Problem Type    Condition    ICD-9 Code    Onset Dates    Condition Status

 

             Assessment    Pain in right knee    M25.561                   Active

 

             Assessment    Pain in left knee    M25.562                   Active

 

             Problem      Osteoarthritis of knees, bilateral    M17.0                     Active

 

             Assessment    Osteopenia    M85.80                    Active

 

             Assessment    ESR raised    R70.0                     Active

 

             Assessment    Osteoarthritis of knees, bilateral    M17.0                     Active

 

             Assessment    Counseling NOS    Z71.9                     Active



                                                                                
                                                                   



Medications

          





        Medication    Code System    Code    Instructions    Start Date    End Date    Status    Dosage



 

                Diclofenac-Misoprostol    [facility]        [address]-10    75-0.2 MG Orally Twice a day prn

 with food      2016                    Active          1 tablet

 

                Lisinopril-Hydrochlorothiazide    [facility]        [address]-32    20-25 MG Orally Once a 

day                                             Active          1 tablet

 

        Diclofenac Sodium    [facility]    [address]-07    1 % Transdermal                     Active    Unknown



 

        Calcium    Unknown    0        Oral                     Active    1 tab

 

        Simvastatin    [facility]    [address]-77    40 MG Orally Once a day                    Active    1 tablet

 in the evening

 

        Aspirin    [facility]    [address]-17    81 MG Orally Once a day                    Active    1 tablet



 

        Lidocaine    [facility]    [address]-32    4 % Externally                     Active    Unknown

 

          Prevacid 24HR    [facility]    [address]-35    15 MG Orally Once a day PRN                        Active

                                        1 capsule

 

        RelyyT    [facility]    [address]-66    0.025-5 % Externally                     Active    Unknown

 

                Diclofenac-Misoprostol    [facility]        [address]-99    75-0.2 MG Orally Twice a day prn

 with food                                      Active          1 tablet

 

        Multivitamin/Minerals    Unknown    0         QD                    Active    1 Tab



                                                                                
                                                                                
                          



Social History

          





                    Social History Element    Qualifiers          Date Reported

 

                    Smoking status:     .  Are you a: Never Smoker    2016



                                                                                
                 



Vital Signs

          





                          Date/Time:                2016

 

                          Height                    61 in

 

                          Blood Pressure Diastolic    82 mm Hg

 

                          Blood Pressure Systolic    126 mm Hg

 

                          Weight                    246 lbs



                                                                    



Summary Purpose

          [employer] Submission

## 2018-10-16 NOTE — XMS REPORT
Summary of Care: 10/7/17 - 10/7/17

                             Created on: 2129



LES BOYD

External Reference #: [id number]

: 1955

Sex: Female



Demographics







                          Address                   [address]

[place]  [address]-

 

                          Home Phone                [phone]

 

                          Preferred Language        English

 

                          Marital Status            

 

                          Orthodox Affiliation     Unknown

 

                          Race                      Other

 

                          Ethnic Group              Non-





Author







                          Author                    [facility]

 

                          [facility]

 

                          Address                   Unknown

 

                          Phone                     Unavailable







Encounter





HQ Encntr_alias(FIN) [id number] Date(s): 10/7/17 - 10/7/17

[facility] [address] [place] [address]      [phone]
[phone]

Discharge Disposition: Home or Self Care

Attending Physician: Denisa Flaherty MD





Vital Signs





No data available for this section



Problem List





No data available for this section



Allergies, Adverse Reactions, Alerts





No data available for this section



Medications





No data available for this section



Results





No data available for this section



Immunizations





No data available for this section



Procedures





No data available for this section



Social History





No data available for this section



Assessment and Plan





No data available for this section

## 2018-10-16 NOTE — XMS REPORT
Summary of Care: 16 - 16

                             Created on: 2107



LES BOYD

External Reference #: [id number]

: 1955

Sex: Female



Demographics







                          Address                   [address]

[place]  [address]

 

                          Home Phone                [phone]

 

                          Preferred Language        English

 

                          Marital Status            

 

                          Catholic Affiliation     Unknown

 

                          Race                      Other

 

                          Ethnic Group              Non-





Author







                          Author                    [facility]

 

                          [facility]

 

                          Address                   Unknown

 

                          Phone                     Unavailable







Encounter





HQ Encntr_alias(FIN) [id number] Date(s): 16 - 16

[facility] [address] [place] [address]      [phone]
[phone]

Discharge Disposition: Home or Self Care

Attending Physician: Maricel Bailey MD





Vital Signs





No data available for this section



Problem List





No data available for this section



Allergies, Adverse Reactions, Alerts





No data available for this section



Medications





No data available for this section



Results





No data available for this section



Immunizations





No data available for this section



Procedures





No data available for this section



Social History





No data available for this section



Assessment and Plan





No data available for this section

## 2018-10-16 NOTE — XMS REPORT
Summary of Care: 14 - 14

                             Created on: 2089



LES BOYD

External Reference #: [id number]

: 1955

Sex: Female



Demographics







                          Address                   [address]

[place]  [address]

 

                          Home Phone                [phone]

 

                          Preferred Language        English

 

                          Marital Status            

 

                          Mandaen Affiliation     Unknown

 

                          Race                      Other

 

                          Ethnic Group              Non-





Author







                          Organization              Unknown

 

                          Address                   Unknown

 

                          Phone                     Unavailable







Encounter





HQ Encntr_alias(FIN) [id number] Date(s): 14 - 14

[facility] [address] [address]     [phone]

Discharge Disposition: Home

Physician Attending: Ben Flaherty MD





Reason for Visit





V76.12 - SCREEN MAMMOGRA



Problem List





No data available for this section



Allergies, Adverse Reactions, Alerts





No data available for this section



Medications





No data available for this section



Medications Administered During Your Visit





No data available for this section



Immunizations





No data available for this section

## 2018-10-16 NOTE — XMS REPORT
[facility]

                             Created on: 2016



Yarely Hayes

External Reference #: [id number]

: 1955

Sex: Female



Demographics







                          Address                   [address] [place]  [address]

 

                          Home Phone                [phone]

 

                          Preferred Language        Unknown

 

                          Marital Status            Unknown

 

                          Mandaen Affiliation     Unknown

 

                          Race                      Unknown

 

                          Ethnic Group              /Latin





Author







                          Author                    Mayra Daley

 

                          Organization              [facility]

 

                          Address                   Unknown

 

                          Phone                     Unavailable







Care Team Providers







                    Care Team Member Name    Role                Phone

 

                    Mayra Daley      CP                  Unavailable



                                            



Encounters

          





                    Encounter           Location            Date

 

                    Follow Up Lab & MRI Results    [facility]    2016

 

                    New Vit D           [facility]    2016

 

                    Vit D refill        [facility]    Nov 10, 2016

 

                    Osteoarthritis      [facility]    2016

 

                    Mri/Dexa Order      [facility]    Beena 10, 2016

 

                    Synvisc one-Pending Denial    [facility]    2016



                                                                                
                                                         



Problems

          





             Problem Type    Condition    ICD-9 Code    Onset Dates    Condition Status

 

             Problem      Osteoarthritis of knees, bilateral    M17.0                     Active



                                                                                
       



Medications

          





        Medication    Code System    Code    Instructions    Start Date    End Date    Status    Dosage



 

                Vitamin D (Ergocalciferol)    [facility]        15070132888     00282 UNIT Orally Once weekly

                                                Active          1 capsule



                                                                                
       



Social History

          





                    Social History Element    Qualifiers          Date Reported

 

                    Smoking status:     .  Are you a: Never Smoker    2016



                                                                    



Summary Purpose

          [employer] Submission

## 2018-10-16 NOTE — XMS REPORT
[facility]

                             Created on: 05/15/2017



Yarely Hayes

External Reference #: [id number]

: 1955

Sex: Female



Demographics







                          Address                   [address] [place]  [address]

 

                          Home Phone                [phone]

 

                          Preferred Language        Unknown

 

                          Marital Status            Unknown

 

                          Yarsani Affiliation     Unknown

 

                          Race                      Unknown

 

                          Ethnic Group              /Latin





Author







                          Author                    Mayra Daley

 

                          Organization              [facility]

 

                          Address                   Unknown

 

                          Phone                     Unavailable







Care Team Providers







                    Care Team Member Name    Role                Phone

 

                    Edi  Mayra      CP                  Unavailable



                                                                



Allergies, Adverse Reactions, Alerts

          





                    Substance           Reaction            Event Type

 

                    N.K.D.A.            Info Not Available    Non Drug Allergy



                                                                                
       



Problems

          





             Problem Type    Condition    Code         Onset Dates    Condition Status

 

             Assessment    ESR raised    R70.0                     Active

 

             Assessment    Vitamin D deficiency    E55.9                     Active

 

             Problem      Osteoarthritis of knees, bilateral    M17.0                     Active

 

             Assessment    Pain in right knee    M25.561                   Active

 

             Problem      Vitamin D deficiency    E55.9                     Active

 

             Assessment    Counseling NOS    Z71.9                     Active

 

             Assessment    Osteopenia    M85.80                    Active

 

             Assessment    Pain in left knee    M25.562                   Active

 

             Assessment    Osteoarthritis of knees, bilateral    M17.0                     Active



                                                                                
                                                                                
      



Medications

          





        Medication    Code System    Code    Instructions    Start Date    End Date    Status    Dosage



 

        Simvastatin    [facility]     [address]-36    40 MG Orally Once a day                    Active    1 tablet

 in the evening

 

           Vitamin D (Ergocalciferol)    [facility]        07091768399    30218 UNIT Orally Once weekly               

                          Active                    1 capsule

 

        Prevacid 24HR    [facility]     [address]-92    15 MG Orally Once a day PRN                    Active    1 

capsule

 

        Multivitamin/Minerals    [facility]     0         QD                    Active    1 Tab

 

             Lisinopril-Hydrochlorothiazide    [facility]          [address]-10    20-25 MG Orally Once a day    

                                        Active              1 tablet

 

                Diclofenac-Misoprostol    [facility]             [address]-70    75-0.2 MG Orally Twice a day prn with

 food                           2017    Active          1 tablet

 

        Aspirin    [facility]     [address]-81    81 MG Orally Once a day                    Active    1 tablet

 

        Bioflex    [facility]     [address]-30     Orally Once a day                    Active    1 tab(s)

 

        Calcium    [facility]     0        Oral                     Active    1 tab

 

        Diclofenac Sodium    [facility]     [address]-28    1 % Transdermal                     Inactive    not defined





                                                                                
                                                                                
                          



Vital Signs

          





                          Date/Time:                May 15, 2017

 

                          Height                    61 in

 

                          Blood Pressure Diastolic    79 mm Hg

 

                          Blood Pressure Systolic    127 mm Hg

 

                          Weight                    243 lbs



                                                                              



Results

          No Known Results                                                      
             



Summary Purpose

          [employer] Submission

## 2018-10-16 NOTE — XMS REPORT
Clinical Summary

                             Created on: 10/16/2018



Yarely Hayes

External Reference #: [id number]

: 1955

Sex: Female



Demographics







                          Address                   [address] [place]  [address]

 

                          Home Phone                [phone]

 

                          Preferred Language        English

 

                          Marital Status            

 

                          Alevism Affiliation     [id number]

 

                          Race                      Unknown

 

                          Ethnic Group              /Latin





Author







                          Author                    [facility]

 

                          Organization              [facility]

 

                          Address                   Unknown

 

                          Phone                     Unavailable







Support







                Name            Relationship    Address         Phone

 

                FreddyHugh    [employer]            Unknown         [phone]







Care Team Providers







                    Care Team Member Name    Role                Phone

 

                    Denisa Flaherty MD    PCP                 [phone]







Allergies

Not on File



Current Medications

Not on file



Active Problems





Not on file



Encounters







    



              Date         Type         Specialty     Care Team     Description

 

    



              2018     Consult      Weight Management     Denisa Flaherty MD     Obesity, 

unspecified



                           Geri Saxena RD     obesity severity,



                                         unspecified obesity type



                                         (Primary Dx)

 

    



                 2018      Telephone       Weight Management     Geri Saxena RD 

 

    



              2018     Consult      Weight Management     Denisa Flaherty MD     Obesity, 

unspecified



                           Geri Saxena RD     obesity severity,



                                         unspecified obesity type



                                         (Primary Dx)

 

    



              2018     Consult      Weight Management     Denisa Flaherty MD     Obesity, 

unspecified



                           Geri Saxena RD     obesity severity,



                                         unspecified obesity type



                                         (Primary Dx)

 

    



                 2018      Telephone       Weight Management     Geri Saxena RD 



after 10/14/2017



Social History







    



              Tobacco Use     Types        Packs/Day     Years Used     Date

 

    



                                         Never Assessed    









 



                           Sex Assigned at Birth     Date Recorded

 

 



                                         Not on file 







Last Filed Vital Signs







  



                     Vital Sign          Reading             Time Taken

 

  



                     Blood Pressure      -                   -

 

  



                     Pulse               -                   -

 

  



                     Temperature         -                   -

 

  



                     Respiratory Rate     -                   -

 

  



                     Oxygen Saturation     -                   -

 

  



                     Inhaled Oxygen      -                   -



                                         Concentration  

 

  



                     Weight              105 kg (230 lb 9.6 oz)     2018 12:58 PM CDT

 

  



                     Height              154.9 cm (5' 1")     2018 12:58 PM CDT

 

  



                     Body Mass Index     43.57               2018 12:58 PM CDT







Plan of Treatment







   



                 Health Maintenance     Due Date        Last Done       Comments

 

   



                           CERVICAL CANCER SCREENING     1977  

 

   



                           BREAST CANCER SCREENING     2006  

 

   



                           COLON CANCER SCREENING     2006  

 

   



                           SHINGRIX VACCINE (#1)     2006  

 

   



                           ZOSTER VACCINE            2016  

 

   



                           INFLUENZA VACCINE         2018  







Results

Not on fileafter 10/14/2017



Insurance







     



            Payer      Benefit     Subscriber ID     Type       Phone      Address



                                         Plan /    



                                         Group    

 

     



                 [employer]      [employer]     [employer]  



                                         ACCESS/NET    



                                         WORK    









     



            Guarantor Name     Account     Relation to     Date of     Phone      Billing Address



                     Type                Patient             Birth  

 

     



            YARELY HAYES     Personal/F     Self       1955     Home:      [address] [place] [address]               [phone]     [place] [address]

## 2018-10-16 NOTE — XMS REPORT
[facility]

                             Created on: 2016



Yarely Hayes

External Reference #: [id number]

: 1955

Sex: Female



Demographics







                          Address                   [address] [place]  [address]

 

                          Home Phone                [phone]

 

                          Preferred Language        Unknown

 

                          Marital Status            Unknown

 

                          Anabaptism Affiliation     Unknown

 

                          Race                      Unknown

 

                          Ethnic Group              /Latin





Author







                          Author                    Mayra Daley

 

                          Organization              [employer]

 

                          Address                   Unknown

 

                          Phone                     Unavailable







Care Team Providers







                    Care Team Member Name    Role                Phone

 

                    Mayra Daley      CP                  Unavailable



                                            



Encounters

          





                    Encounter           Location            Date

 

                    Osteoarthritis      [facility]    2016

 

                    [facility]Dexa Order      [facility]    [facility], 2016



                                                                                
                 



Problems

          





             Problem Type    Condition    ICD-9 Code    Onset Dates    Condition Status

 

             Assessment    Osteoarthritis of knees, bilateral    M17.0                     Active

 

                    Assessment          Age-related osteoporosis without current pathological fracture    M81.0

                                                    Active

 

             Problem      Osteoarthritis of knees, bilateral    M17.0                     Active



                                                                                
                           



Social History

          





                    Social History Element    Qualifiers          Date Reported

 

                    Smoking status:     .  Are you a: Never Smoker    2016



                                                                    



Summary Purpose

          [employer] Submission